# Patient Record
Sex: FEMALE | Employment: OTHER | URBAN - METROPOLITAN AREA
[De-identification: names, ages, dates, MRNs, and addresses within clinical notes are randomized per-mention and may not be internally consistent; named-entity substitution may affect disease eponyms.]

---

## 2023-04-19 ENCOUNTER — OFFICE VISIT (OUTPATIENT)
Dept: PRIMARY CARE | Facility: CLINIC | Age: 43
End: 2023-04-19
Payer: COMMERCIAL

## 2023-04-19 VITALS
TEMPERATURE: 97.8 F | DIASTOLIC BLOOD PRESSURE: 87 MMHG | OXYGEN SATURATION: 94 % | WEIGHT: 158.4 LBS | SYSTOLIC BLOOD PRESSURE: 125 MMHG | HEART RATE: 112 BPM

## 2023-04-19 DIAGNOSIS — M79.7 FIBROMYALGIA: ICD-10-CM

## 2023-04-19 DIAGNOSIS — G43.809 OTHER MIGRAINE WITHOUT STATUS MIGRAINOSUS, NOT INTRACTABLE: ICD-10-CM

## 2023-04-19 DIAGNOSIS — J30.9 ALLERGIC RHINITIS, UNSPECIFIED SEASONALITY, UNSPECIFIED TRIGGER: Primary | ICD-10-CM

## 2023-04-19 DIAGNOSIS — Z72.0 TOBACCO USE: ICD-10-CM

## 2023-04-19 PROCEDURE — 99214 OFFICE O/P EST MOD 30 MIN: CPT | Performed by: FAMILY MEDICINE

## 2023-04-19 RX ORDER — LIDOCAINE 50 MG/G
PATCH TOPICAL
COMMUNITY
Start: 2023-02-20 | End: 2024-02-09 | Stop reason: WASHOUT

## 2023-04-19 RX ORDER — HYDROXYZINE HYDROCHLORIDE 10 MG/1
10 TABLET, FILM COATED ORAL DAILY PRN
Qty: 30 TABLET | Refills: 2 | Status: SHIPPED | OUTPATIENT
Start: 2023-04-19 | End: 2024-03-13 | Stop reason: ALTCHOICE

## 2023-04-19 RX ORDER — AMITRIPTYLINE HYDROCHLORIDE 150 MG/1
1 TABLET ORAL NIGHTLY
COMMUNITY
Start: 2019-08-19 | End: 2024-02-15

## 2023-04-19 RX ORDER — PREGABALIN 75 MG/1
CAPSULE ORAL
COMMUNITY
Start: 2019-08-19 | End: 2024-02-09 | Stop reason: WASHOUT

## 2023-04-19 RX ORDER — PRAZOSIN HYDROCHLORIDE 1 MG/1
1 CAPSULE ORAL NIGHTLY
COMMUNITY
Start: 2023-04-12 | End: 2024-03-13 | Stop reason: WASHOUT

## 2023-04-19 RX ORDER — LIDOCAINE 50 MG/G
OINTMENT TOPICAL
COMMUNITY
Start: 2023-04-18 | End: 2024-02-09 | Stop reason: SDUPTHER

## 2023-04-19 RX ORDER — CELECOXIB 200 MG/1
CAPSULE ORAL
COMMUNITY
Start: 2023-04-18

## 2023-04-19 RX ORDER — SUMATRIPTAN 50 MG/1
50 TABLET, FILM COATED ORAL ONCE AS NEEDED
Qty: 10 TABLET | Refills: 3 | Status: SHIPPED | OUTPATIENT
Start: 2023-04-19 | End: 2024-03-13 | Stop reason: SDUPTHER
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
## 2023-12-12 ENCOUNTER — OFFICE VISIT (OUTPATIENT)
Dept: PRIMARY CARE | Facility: CLINIC | Age: 43
End: 2023-12-12
Payer: COMMERCIAL

## 2023-12-12 VITALS
SYSTOLIC BLOOD PRESSURE: 119 MMHG | RESPIRATION RATE: 16 BRPM | TEMPERATURE: 98.2 F | OXYGEN SATURATION: 100 % | WEIGHT: 147 LBS | HEART RATE: 115 BPM | BODY MASS INDEX: 26.05 KG/M2 | DIASTOLIC BLOOD PRESSURE: 83 MMHG | HEIGHT: 63 IN

## 2023-12-12 DIAGNOSIS — M79.7 FIBROMYALGIA: Primary | ICD-10-CM

## 2023-12-12 DIAGNOSIS — Z12.31 ENCOUNTER FOR SCREENING MAMMOGRAM FOR MALIGNANT NEOPLASM OF BREAST: ICD-10-CM

## 2023-12-12 LAB
25(OH)D3 SERPL-MCNC: 8 NG/ML (ref 30–100)
ANION GAP SERPL CALC-SCNC: 14 MMOL/L (ref 10–20)
BUN SERPL-MCNC: 13 MG/DL (ref 6–23)
CALCIUM SERPL-MCNC: 9.6 MG/DL (ref 8.6–10.6)
CHLORIDE SERPL-SCNC: 107 MMOL/L (ref 98–107)
CO2 SERPL-SCNC: 23 MMOL/L (ref 21–32)
CREAT SERPL-MCNC: 0.67 MG/DL (ref 0.5–1.05)
GFR SERPL CREATININE-BSD FRML MDRD: >90 ML/MIN/1.73M*2
GLUCOSE SERPL-MCNC: 82 MG/DL (ref 74–99)
POTASSIUM SERPL-SCNC: 4.4 MMOL/L (ref 3.5–5.3)
SODIUM SERPL-SCNC: 140 MMOL/L (ref 136–145)
TSH SERPL-ACNC: 0.68 MIU/L (ref 0.44–3.98)

## 2023-12-12 PROCEDURE — 82306 VITAMIN D 25 HYDROXY: CPT | Performed by: NURSE PRACTITIONER

## 2023-12-12 PROCEDURE — 99214 OFFICE O/P EST MOD 30 MIN: CPT | Performed by: NURSE PRACTITIONER

## 2023-12-12 PROCEDURE — 84443 ASSAY THYROID STIM HORMONE: CPT | Performed by: NURSE PRACTITIONER

## 2023-12-12 PROCEDURE — 36415 COLL VENOUS BLD VENIPUNCTURE: CPT | Performed by: NURSE PRACTITIONER

## 2023-12-12 PROCEDURE — 80048 BASIC METABOLIC PNL TOTAL CA: CPT | Performed by: NURSE PRACTITIONER

## 2023-12-12 RX ORDER — BACLOFEN 5 MG/1
5 TABLET ORAL DAILY
Qty: 30 TABLET | Refills: 3 | Status: SHIPPED | OUTPATIENT
Start: 2023-12-12 | End: 2024-02-09

## 2023-12-12 ASSESSMENT — PAIN SCALES - GENERAL: PAINLEVEL: 8

## 2023-12-12 NOTE — PROGRESS NOTES
"Subjective   Irina Duran is a 43 y.o. female who presents for Med Refill (Follow up).  Med Refill      Ms. Duran is a 44 yo F, patient of Dr. Aly, here today for follow up    LV with [facility] in August with referrals provided for pain management and rheumatology.   Patient continues to utilize medical marijuana for management of chronic pain. Expresses curiosity about any changes with controlled substance prescribing and recreational marijuana. Patient understands to follow with pain management for further discussion regarding restarting Lyrica.   She is asking for new referral to pain management. Was being seen through a private practice and would like to consider alternative option. Celexa and prazosin given through pain medicine.   Denies medication management with mental health services at this time.   Requesting refill of baclofen which she has been using for muscle spasms.   Heart rate is notably elevated today. Improved value noted upon auscultation (99).     Patient notes it has been at least 1.5 years since last mammogram screening. Order placed today.     Last blood work available in our system was from 04/22      All systems reviewed. Review of systems negative except for noted positives in HPI    Objective     /83   Pulse (!) 115   Temp 36.8 °C (98.2 °F)   Resp 16   Ht 1.6 m (5' 3\")   Wt 66.7 kg (147 lb)   LMP 11/15/2023   SpO2 100%   BMI 26.04 kg/m²    Vital signs noted and reviewed.       Physical Exam  Constitutional:       Appearance: Normal appearance.   Cardiovascular:      Rate and Rhythm: Regular rhythm. Tachycardia present.   Pulmonary:      Effort: Pulmonary effort is normal. No respiratory distress.      Breath sounds: Normal breath sounds.   Skin:     General: Skin is warm and dry.   Neurological:      Mental Status: She is oriented to person, place, and time.   Psychiatric:         Mood and Affect: Mood normal.             Assessment/Plan   Problem List Items Addressed This " Visit    None  Visit Diagnoses       Fibromyalgia    -  Primary    Relevant Medications    baclofen (Lioresal) 5 mg tablet    Other Relevant Orders    Referral to Rheumatology    Referral to Pain Medicine    Basic Metabolic Panel    TSH with reflex to Free T4 if abnormal    Vitamin D 25-Hydroxy,Total (for eval of Vitamin D levels)    Encounter for screening mammogram for malignant neoplasm of breast        Relevant Orders    Mammogram - OnBase Scan

## 2023-12-12 NOTE — PATIENT INSTRUCTIONS
Thank you for coming in for your visit today!    Please follow up in 6 months with Dr. Aly or sooner if needed.     Referrals for pain medicine and rheumatology provided.     Today we completed blood work. We will contact you with any abnormalities from this testing.      Call 911 or go to the emergency room if you have pain in your chest, difficulty breathing, or other life threatening symptoms.

## 2023-12-13 DIAGNOSIS — E55.9 VITAMIN D DEFICIENCY: Primary | ICD-10-CM

## 2023-12-13 RX ORDER — ERGOCALCIFEROL 1.25 MG/1
50000 CAPSULE ORAL
Qty: 12 CAPSULE | Refills: 0 | Status: SHIPPED | OUTPATIENT
Start: 2023-12-13 | End: 2024-03-06

## 2024-02-09 ENCOUNTER — OFFICE VISIT (OUTPATIENT)
Dept: PAIN MEDICINE | Facility: CLINIC | Age: 44
End: 2024-02-09
Payer: COMMERCIAL

## 2024-02-09 VITALS
SYSTOLIC BLOOD PRESSURE: 130 MMHG | RESPIRATION RATE: 18 BRPM | DIASTOLIC BLOOD PRESSURE: 67 MMHG | TEMPERATURE: 98.2 F | HEART RATE: 106 BPM

## 2024-02-09 DIAGNOSIS — M43.06 LUMBAR SPONDYLOLYSIS: Primary | ICD-10-CM

## 2024-02-09 PROCEDURE — 99213 OFFICE O/P EST LOW 20 MIN: CPT | Mod: ZK

## 2024-02-09 PROCEDURE — 99213 OFFICE O/P EST LOW 20 MIN: CPT

## 2024-02-09 RX ORDER — TIZANIDINE 4 MG/1
4 TABLET ORAL 2 TIMES DAILY PRN
Qty: 60 TABLET | Refills: 2 | Status: SHIPPED | OUTPATIENT
Start: 2024-02-09 | End: 2024-05-15 | Stop reason: SDUPTHER

## 2024-02-09 RX ORDER — DULOXETIN HYDROCHLORIDE 60 MG/1
60 CAPSULE, DELAYED RELEASE ORAL DAILY
Qty: 30 CAPSULE | Refills: 2 | Status: SHIPPED | OUTPATIENT
Start: 2024-02-09 | End: 2024-05-15 | Stop reason: SDUPTHER

## 2024-02-09 RX ORDER — LIDOCAINE 50 MG/G
OINTMENT TOPICAL
Qty: 30 G | Refills: 2 | Status: SHIPPED | OUTPATIENT
Start: 2024-02-09

## 2024-02-09 ASSESSMENT — COLUMBIA-SUICIDE SEVERITY RATING SCALE - C-SSRS
6. HAVE YOU EVER DONE ANYTHING, STARTED TO DO ANYTHING, OR PREPARED TO DO ANYTHING TO END YOUR LIFE?: NO
1. IN THE PAST MONTH, HAVE YOU WISHED YOU WERE DEAD OR WISHED YOU COULD GO TO SLEEP AND NOT WAKE UP?: NO
2. HAVE YOU ACTUALLY HAD ANY THOUGHTS OF KILLING YOURSELF?: NO

## 2024-02-09 ASSESSMENT — PAIN SCALES - GENERAL
PAINLEVEL: 5
PAINLEVEL_OUTOF10: 5 - MODERATE PAIN

## 2024-02-09 ASSESSMENT — PAIN DESCRIPTION - DESCRIPTORS: DESCRIPTORS: SHARP

## 2024-02-09 ASSESSMENT — PAIN - FUNCTIONAL ASSESSMENT: PAIN_FUNCTIONAL_ASSESSMENT: 0-10

## 2024-02-09 NOTE — PATIENT INSTRUCTIONS
Continue to take duloxetine, tizanidine, amitriptyline and celebrex as prescribed.   Use lidocaine ointment as directed.     Follow up in 3 months or as needed.

## 2024-02-09 NOTE — PROGRESS NOTES
Subjective   Patient ID: Irina Duran is a 43 y.o. female who presents for Med Refill.  HPI    44 yo female presents today for medication refills. She is known to this clinic for chronic low back pain and is maintained on amitriptyline, duloxetine, lidocaine 5% ointment, celebrex and tizanidine. She is also using medical marijuana for pain control. She confirms that the medication regimen is helpful in reducing her overall pain level and denies any side effects associated with the medications. She was prescribed baclofen by PCP, this was not effective Pain today is rated 5/10 in the middle of her back and her ankles. Denies any recent falls or injuries. Denies bowel or bladder incontinence.         Review of Systems    All 13 systems were reviewed and are within normal levels except as noted below or per HPI. Positive and pertinent negative responses are noted below or in the HPI   Denied any fever or chills. No weight loss and no night sweats. No cough or sputum production. No diarrhea   No bladder and bowel incontinence and no other changes in bladder and bowel. No skin changes. Reports tiredness and fatigability only if the pain is not controlled.   Denied opioids diversion and abuse and denies alcoholism. Denies overuse of the pain medications.      Objective   Physical Exam    General   Alert and oriented x4, pleasant and cooperative.      HEENT  Pupils are equal and normal in size. Ears, nose, mouth, and throat appear to be WNL.  Head atraumatic, symmetric.      No signs of sedation or signs of withdrawal apparent.     Psychiatric   No signs of depression apparent. Appropriate mood and affect.     Neuro   No focal neurological deficit apparent. Ambulation at baseline.      Respiratory  No respiratory distress, respirations equal and unlabored.     Abdomen  Soft and nontender, no distention noted.     Skin  Warm, dry and intact. No skin markings supportive of recent IV drug usage .            Assessment/Plan      44 yo female with history and physical exam supportive of chronic low back pain associated with lumbago and lumbar spondylosis.     Continue to take duloxetine, tizanidine, amitriptyline and celebrex as prescribed.   Use lidocaine ointment as directed.     Follow up in 3 months or as needed.     Explained plan to this patient, and patient verbalized understanding and agreement with the plan.     Please do not hesitate to contact the pain clinic after your visit with any questions or concerns at  M-F 8-4 pm     Patient was reminded not to share medications, not to take prescription medications that were not prescribed to the patient, and not to increase or change dose without consulting the pain clinic. I advised the patient to always take the least amount of medication needed to keep symptoms under control.          Shayy Mcqueen, IDRIS-CNP 02/09/24 9:09 AM

## 2024-03-13 ENCOUNTER — OFFICE VISIT (OUTPATIENT)
Dept: PRIMARY CARE | Facility: CLINIC | Age: 44
End: 2024-03-13
Payer: COMMERCIAL

## 2024-03-13 VITALS
RESPIRATION RATE: 14 BRPM | BODY MASS INDEX: 25.87 KG/M2 | HEIGHT: 63 IN | HEART RATE: 90 BPM | WEIGHT: 146 LBS | TEMPERATURE: 98.3 F | OXYGEN SATURATION: 96 % | DIASTOLIC BLOOD PRESSURE: 72 MMHG | SYSTOLIC BLOOD PRESSURE: 100 MMHG

## 2024-03-13 DIAGNOSIS — Z72.0 TOBACCO USE: ICD-10-CM

## 2024-03-13 DIAGNOSIS — Z91.018 FOOD ALLERGY: ICD-10-CM

## 2024-03-13 DIAGNOSIS — J30.9 ALLERGIC RHINITIS, UNSPECIFIED SEASONALITY, UNSPECIFIED TRIGGER: ICD-10-CM

## 2024-03-13 DIAGNOSIS — G43.809 OTHER MIGRAINE WITHOUT STATUS MIGRAINOSUS, NOT INTRACTABLE: Primary | ICD-10-CM

## 2024-03-13 DIAGNOSIS — L30.9 ECZEMA, UNSPECIFIED TYPE: ICD-10-CM

## 2024-03-13 PROBLEM — M06.9 RHEUMATOID ARTHRITIS (MULTI): Chronic | Status: ACTIVE | Noted: 2020-06-08

## 2024-03-13 PROBLEM — T30.0 BURN: Status: ACTIVE | Noted: 2022-04-06

## 2024-03-13 PROBLEM — F12.10 CANNABIS USE DISORDER, MILD, ABUSE: Chronic | Status: ACTIVE | Noted: 2022-06-05

## 2024-03-13 PROBLEM — M32.9 SYSTEMIC LUPUS ERYTHEMATOSUS (MULTI): Chronic | Status: ACTIVE | Noted: 2020-06-08

## 2024-03-13 PROBLEM — M43.00 SPONDYLOLYSIS: Status: ACTIVE | Noted: 2024-03-13

## 2024-03-13 PROBLEM — M41.9 SCOLIOSIS: Status: ACTIVE | Noted: 2024-03-13

## 2024-03-13 PROCEDURE — 99214 OFFICE O/P EST MOD 30 MIN: CPT | Performed by: FAMILY MEDICINE

## 2024-03-13 PROCEDURE — 90471 IMMUNIZATION ADMIN: CPT | Performed by: FAMILY MEDICINE

## 2024-03-13 RX ORDER — VARENICLINE TARTRATE 0.5 MG/1
TABLET, FILM COATED ORAL
Qty: 120 TABLET | Refills: 2 | Status: SHIPPED | OUTPATIENT
Start: 2024-03-13

## 2024-03-13 RX ORDER — CETIRIZINE HYDROCHLORIDE 10 MG/1
10 TABLET ORAL DAILY
Qty: 30 TABLET | Refills: 11 | Status: SHIPPED | OUTPATIENT
Start: 2024-03-13 | End: 2025-03-08

## 2024-03-13 RX ORDER — TRIAMCINOLONE ACETONIDE 1 MG/G
OINTMENT TOPICAL 2 TIMES DAILY PRN
Qty: 60 G | Refills: 2 | Status: SHIPPED | OUTPATIENT
Start: 2024-03-13 | End: 2024-07-11

## 2024-03-13 RX ORDER — VARENICLINE TARTRATE 0.5 MG/1
TABLET, FILM COATED ORAL
Qty: 120 TABLET | Refills: 2 | Status: SHIPPED | OUTPATIENT
Start: 2024-03-13 | End: 2024-03-13 | Stop reason: SDUPTHER

## 2024-03-13 RX ORDER — EPINEPHRINE 0.3 MG/.3ML
1 INJECTION SUBCUTANEOUS AS NEEDED
Qty: 2 EACH | Refills: 0 | Status: SHIPPED | OUTPATIENT
Start: 2024-03-13 | End: 2025-03-13

## 2024-03-13 RX ORDER — SUMATRIPTAN 50 MG/1
50 TABLET, FILM COATED ORAL ONCE AS NEEDED
Qty: 10 TABLET | Refills: 3 | Status: SHIPPED | OUTPATIENT
Start: 2024-03-13 | End: 2025-03-13

## 2024-03-13 ASSESSMENT — PAIN SCALES - GENERAL: PAINLEVEL: 5

## 2024-03-13 NOTE — PROGRESS NOTES
"Subjective   Patient ID: Irina Duran is a 43 y.o. who presents for Follow-up    42 yo here for follow-up    Fibromyalgia- Follows with pain management. Taking amitriptyline, celebrex, tizanidine    Allergies  - Can't have sudafed  - Loratadine didn't help  - Hydroxyzine no longer helps  - Not open to trying nasal sprays    Eczema  - Chronic, not currently using anything. Would like cream. Has tried OTC hydrocortisone without relief      Migraine- Longstanding issue, unchanged. No change in quantity, severity or frequency of migraines. Sumatriptan for abortive management.    - Goes through 9 sumatriptan in a month  - Would like to discuss daily medication, however already on maximum dose of amitriptyline. BP low so would like to avoid any medication with potential BP lowering effect    Cigarette smoking  - Has cut back to 1/2 ppd  - Would like to resume Chantix. Tolerated this in the past  - No prior issues with side effects     Asthma  - Declines controller medication. Wants to try quitting smoking first      Review of Systems  10 point review of systems negative except as noted in HPI.    Objective     /72   Pulse 108   Temp 36.8 °C (98.3 °F)   Resp 14   Ht 1.6 m (5' 3\")   Wt 66.2 kg (146 lb)   LMP 02/15/2024   SpO2 96%   BMI 25.86 kg/m²   General: well appearing, no distress  CV: Regular rate and rhythm, no murmur  Lungs: Clear to auscultation bilaterally  Abdomen: Soft, nontender, nondistended  Extremities: No edema noted  Skin: Upper extremities with patches of dry skin, excoriations   Psych: Appropriate mood and affect     Assessment/Plan   42 yo here for follow-up    # Migraine  - Refer to neurology    # Seasonal allergies  - Trial zyrtec    #Eczema  - Recommended daily moisturizer plus triamcinolone for breakthrough     #Tobacco use  - Counseled on cessation. Chantix prescribed. Discussed use and possible side effects    # HM  - Schedule mammogram    RTC 3 months or sooner as needed  "

## 2024-06-14 PROBLEM — T25.221A SECOND DEGREE BURN OF RIGHT FOOT: Status: ACTIVE | Noted: 2022-04-20

## 2024-06-14 PROBLEM — M54.50 LUMBAGO: Status: ACTIVE | Noted: 2023-03-24

## 2024-06-14 PROBLEM — T22.299A: Status: ACTIVE | Noted: 2022-04-20

## 2024-06-14 PROBLEM — F39 UNSPECIFIED MOOD (AFFECTIVE) DISORDER (CMS-HCC): Chronic | Status: ACTIVE | Noted: 2022-05-20

## 2024-06-14 PROBLEM — M79.2 PERIPHERAL NEUROPATHIC PAIN: Status: ACTIVE | Noted: 2024-06-14

## 2024-06-14 PROBLEM — G43.909 MIGRAINE: Status: ACTIVE | Noted: 2020-06-08

## 2024-06-14 PROBLEM — M32.9 SYSTEMIC LUPUS ERYTHEMATOSUS (MULTI): Status: ACTIVE | Noted: 2020-06-08

## 2024-06-14 PROBLEM — M79.7 FIBROMYALGIA: Status: ACTIVE | Noted: 2023-12-12

## 2024-06-14 PROBLEM — F19.21: Status: ACTIVE | Noted: 2024-06-14

## 2024-06-14 PROBLEM — F10.21: Status: ACTIVE | Noted: 2021-06-07

## 2024-06-14 PROBLEM — T20.20XA SECOND DEGREE BURN OF FACE: Status: ACTIVE | Noted: 2022-04-20

## 2024-06-14 PROBLEM — G47.00 INSOMNIA: Status: ACTIVE | Noted: 2024-06-14

## 2024-06-14 PROBLEM — F17.200 SEVERE TOBACCO USE DISORDER: Status: ACTIVE | Noted: 2021-06-07

## 2024-06-14 PROBLEM — M47.816 LUMBAR SPONDYLOSIS: Status: ACTIVE | Noted: 2023-03-24

## 2024-06-14 PROBLEM — M25.50 JOINT PAIN: Status: ACTIVE | Noted: 2022-09-19

## 2024-06-14 PROBLEM — F43.10 POST TRAUMATIC STRESS DISORDER: Chronic | Status: ACTIVE | Noted: 2022-11-23

## 2024-06-14 RX ORDER — ZOLPIDEM TARTRATE 10 MG/1
TABLET ORAL
COMMUNITY
Start: 2019-08-19

## 2024-06-14 RX ORDER — ERGOCALCIFEROL 1.25 MG/1
1 CAPSULE ORAL
COMMUNITY
Start: 2022-10-26 | End: 2024-06-19 | Stop reason: WASHOUT

## 2024-06-14 RX ORDER — DICLOFENAC SODIUM 75 MG/1
1 TABLET, DELAYED RELEASE ORAL 2 TIMES DAILY
COMMUNITY
Start: 2022-06-24

## 2024-06-14 RX ORDER — BACLOFEN 5 MG/1
5 TABLET ORAL DAILY
COMMUNITY
Start: 2024-03-25

## 2024-06-14 RX ORDER — METHYLPREDNISOLONE 4 MG/1
TABLET ORAL
COMMUNITY
Start: 2024-04-02

## 2024-06-14 RX ORDER — DULOXETIN HYDROCHLORIDE 60 MG/1
1 CAPSULE, DELAYED RELEASE ORAL 2 TIMES DAILY
COMMUNITY
Start: 2021-09-16

## 2024-06-14 RX ORDER — LEFLUNOMIDE 10 MG/1
10 TABLET ORAL DAILY
COMMUNITY
Start: 2024-04-24

## 2024-06-14 RX ORDER — CARISOPRODOL 250 MG/1
TABLET ORAL
COMMUNITY
Start: 2019-08-19

## 2024-06-14 RX ORDER — MELOXICAM 15 MG/1
TABLET ORAL
COMMUNITY
Start: 2021-09-16

## 2024-06-14 RX ORDER — ETODOLAC 400 MG/1
TABLET, FILM COATED ORAL
COMMUNITY
Start: 2020-05-08

## 2024-06-14 RX ORDER — BUTALBITAL, ACETAMINOPHEN AND CAFFEINE 50; 325; 40 MG/1; MG/1; MG/1
TABLET ORAL
COMMUNITY
Start: 2019-08-19

## 2024-06-19 ENCOUNTER — OFFICE VISIT (OUTPATIENT)
Dept: PRIMARY CARE | Facility: CLINIC | Age: 44
End: 2024-06-19
Payer: COMMERCIAL

## 2024-06-19 VITALS
SYSTOLIC BLOOD PRESSURE: 99 MMHG | BODY MASS INDEX: 25.16 KG/M2 | TEMPERATURE: 97.3 F | RESPIRATION RATE: 16 BRPM | DIASTOLIC BLOOD PRESSURE: 76 MMHG | OXYGEN SATURATION: 98 % | HEART RATE: 107 BPM | HEIGHT: 63 IN | WEIGHT: 142 LBS

## 2024-06-19 DIAGNOSIS — Z12.31 ENCOUNTER FOR SCREENING MAMMOGRAM FOR MALIGNANT NEOPLASM OF BREAST: ICD-10-CM

## 2024-06-19 DIAGNOSIS — M43.06 LUMBAR SPONDYLOLYSIS: ICD-10-CM

## 2024-06-19 DIAGNOSIS — M54.50 CHRONIC BILATERAL LOW BACK PAIN WITHOUT SCIATICA: ICD-10-CM

## 2024-06-19 DIAGNOSIS — G89.29 CHRONIC BILATERAL LOW BACK PAIN WITHOUT SCIATICA: ICD-10-CM

## 2024-06-19 DIAGNOSIS — J30.9 ALLERGIC RHINITIS, UNSPECIFIED SEASONALITY, UNSPECIFIED TRIGGER: ICD-10-CM

## 2024-06-19 DIAGNOSIS — Z00.00 ROUTINE ADULT HEALTH MAINTENANCE: Primary | ICD-10-CM

## 2024-06-19 DIAGNOSIS — E55.9 VITAMIN D DEFICIENCY: ICD-10-CM

## 2024-06-19 DIAGNOSIS — G43.809 OTHER MIGRAINE WITHOUT STATUS MIGRAINOSUS, NOT INTRACTABLE: ICD-10-CM

## 2024-06-19 DIAGNOSIS — Z72.0 TOBACCO USE: ICD-10-CM

## 2024-06-19 LAB
25(OH)D3 SERPL-MCNC: 51 NG/ML (ref 30–100)
CHOLEST SERPL-MCNC: 188 MG/DL (ref 0–199)
CHOLESTEROL/HDL RATIO: 3.6
EST. AVERAGE GLUCOSE BLD GHB EST-MCNC: 105 MG/DL
HBA1C MFR BLD: 5.3 %
HDLC SERPL-MCNC: 52 MG/DL
LDLC SERPL CALC-MCNC: 96 MG/DL
NON HDL CHOLESTEROL: 136 MG/DL (ref 0–149)
TRIGL SERPL-MCNC: 201 MG/DL (ref 0–149)
VLDL: 40 MG/DL (ref 0–40)

## 2024-06-19 PROCEDURE — 36415 COLL VENOUS BLD VENIPUNCTURE: CPT | Performed by: FAMILY MEDICINE

## 2024-06-19 PROCEDURE — 83036 HEMOGLOBIN GLYCOSYLATED A1C: CPT | Performed by: FAMILY MEDICINE

## 2024-06-19 PROCEDURE — 99396 PREV VISIT EST AGE 40-64: CPT | Performed by: FAMILY MEDICINE

## 2024-06-19 PROCEDURE — 82306 VITAMIN D 25 HYDROXY: CPT | Performed by: FAMILY MEDICINE

## 2024-06-19 PROCEDURE — 80061 LIPID PANEL: CPT | Performed by: FAMILY MEDICINE

## 2024-06-19 RX ORDER — ACETAMINOPHEN 500 MG
1000 TABLET ORAL EVERY 6 HOURS PRN
Qty: 30 TABLET | Refills: 0 | Status: SHIPPED | OUTPATIENT
Start: 2024-06-19 | End: 2024-06-29

## 2024-06-19 RX ORDER — AMITRIPTYLINE HYDROCHLORIDE 150 MG/1
150 TABLET ORAL NIGHTLY
Qty: 30 TABLET | Refills: 5 | Status: SHIPPED | OUTPATIENT
Start: 2024-06-19

## 2024-06-19 RX ORDER — SUMATRIPTAN 50 MG/1
50 TABLET, FILM COATED ORAL ONCE AS NEEDED
Qty: 10 TABLET | Refills: 3 | Status: SHIPPED | OUTPATIENT
Start: 2024-06-19 | End: 2025-06-19

## 2024-06-19 RX ORDER — VARENICLINE TARTRATE 0.5 MG/1
TABLET, FILM COATED ORAL
Qty: 120 TABLET | Refills: 2 | Status: SHIPPED | OUTPATIENT
Start: 2024-06-19

## 2024-06-19 RX ORDER — ACETAMINOPHEN 500 MG
5000 TABLET ORAL DAILY
Qty: 30 TABLET | Refills: 5 | Status: SHIPPED | OUTPATIENT
Start: 2024-06-19

## 2024-06-19 RX ORDER — HYDROXYZINE HYDROCHLORIDE 10 MG/1
10 TABLET, FILM COATED ORAL DAILY PRN
Qty: 30 TABLET | Refills: 5 | Status: SHIPPED | OUTPATIENT
Start: 2024-06-19 | End: 2024-12-16

## 2024-06-19 ASSESSMENT — PAIN SCALES - GENERAL: PAINLEVEL: 8

## 2024-06-19 ASSESSMENT — PATIENT HEALTH QUESTIONNAIRE - PHQ9
SUM OF ALL RESPONSES TO PHQ9 QUESTIONS 1 AND 2: 0
2. FEELING DOWN, DEPRESSED OR HOPELESS: NOT AT ALL
1. LITTLE INTEREST OR PLEASURE IN DOING THINGS: NOT AT ALL

## 2024-06-19 NOTE — PROGRESS NOTES
"Subjective   Patient ID: Irina Duran is a 43 y.o. female who presents for Follow-up and health maintenance visit.     HPI     Irina Duran is a 43 y.o. year old here for an annual physical.    Health maintenance  Diet: Working on dietary modifications. Close to goal weight   Exercise: Limited due to pain  Tobacco: Has decreased use to 1/3PPD  Alcohol: No use  Drugs: Marijuana  Sex: with , no concerns about STDs  Depression: PHQ2=0  IPV: Denies  Breast Cancer- Due for mammogram   Cervical Cancer- Reports done in 2020, due next year  Colon Cancer- Reports done in 2020, due in 2030  Lung Cancer: Does not qualify for screening at this point  Lipid: Due for lipids  A1c: Due  HIV (15-65): Declines  HCV (18-79): Declines     #Wrist pain   - years of pain, gotten worse lately (last since last visit)  - got a new caravan, and the driving makes it feel better than worse. The vibrations help and then it gets worse after   - takes some of her medications   - needs meds refilled  - needs lidocaine    #Asthma   - been better lately   - has not had to use an inhaler since she moved home (2019)  - worse in hot weather     #Eczema   - still itches a lot, been using the creams     #migraines   - pretty bad, worse than normal   - has been out of her meds     #allergies   - worse with the weather   - sneezing, runny nose   - the medications haven't been working, has been taking them   - 8 cats, 1 dog in the house  - Has tried cetirizine, loratadine. Declines nasal sprays. Previously took hydroxyzine with improvement     #Smoking   - three days per pack, taking chantix. Has just reached goal dose       Review of Systems  Negative besides for those mentioned above.     Objective   BP 99/76   Pulse 107   Temp 36.3 °C (97.3 °F)   Resp 16   Ht 1.6 m (5' 3\")   Wt 64.4 kg (142 lb)   LMP 05/15/2024   SpO2 98%   BMI 25.15 kg/m²     Physical Exam  Constitutional:       Appearance: Normal appearance.   HENT:      Head: " Normocephalic.   Cardiovascular:      Rate and Rhythm: Normal rate and regular rhythm.   Pulmonary:      Effort: Pulmonary effort is normal.      Breath sounds: Rhonchi present.      Comments: Rhonchi in lower right lobe   Skin:     General: Skin is warm and dry.      Capillary Refill: Capillary refill takes less than 2 seconds.   Neurological:      General: No focal deficit present.      Mental Status: She is alert and oriented to person, place, and time.   Psychiatric:         Mood and Affect: Mood normal.         Behavior: Behavior normal.       Assessment/Plan   Irina Duran is a 43 y.o. female presenting for a follow-up. She has been out of a lot of her medications, which has led to some of her chronic conditions to worsen (migraine, fibromyalgia, allergies). Her asthma has improved with her cutting back on smoking, so will continue to  on cutting back.     Pain management   - Refilled tylenol, lidocaine patches   - Ordered vitamin D, checking levels today     Migraines  - Refilled sumatriptan, amitriptyline   - neurology referral     Allergies   -  No improvement with cetirizine or loratadine. Can trial atarax, discussed possible side effects.   - Recommended nasal steroids, patient declined    Smoking   - continue with Chantix (refilled today)     Preventative screenings  - Lipid panel   - HbA1c   - Mammogram   - Last pap smear and colonoscopy were four and a half years ago     RTC in 3 months      Samara Becker, MS3     Patient was seen and examined by myself in conjunction with medical student. I have edited note above. Cammie Aly

## 2024-06-19 NOTE — PATIENT INSTRUCTIONS
Thank you for coming in today!    Please schedule a mammogram  I will contact you with your lab results  We will restart the hydroxyzine   Please schedule an appointment with neurology for your migraines    I will see you back in about 3 months

## 2024-06-19 NOTE — PROGRESS NOTES
Subjective   Patient ID: Irina Duran is a 43 y.o. who presents for Follow-up    42 yo here for follow-up    Health maintenance:  Diet: ***  Exercise: ***  Tobacco: ***  Alcohol: ***  Drugs: ***  Sex: ***  LMP: ***  BIrth Control: ***  Depression: ***  IPV: ***    The ASCVD Risk score (Cheryl HUFFMAN, et al., 2019) failed to calculate for the following reasons:    Cannot find a previous HDL lab    Cannot find a previous total cholesterol lab    Unable to determine if patient is Non-   Risk factors: male, AA, older age, HTN, HLD, smoking, DM, FHx  Statin?: ***  ASA? (ASCVD >10%, age 40-59, low risk of bleeding): ***    Immunizations due: ***    Breast Cancer  - FHx?: ***  - last mammogram: ***    Cervical Cancer  - last pap: ***    Colon Cancer  - FHx?: ***  - last colonoscopy: ***    Lung Cancer: ***  Lipid: ***  A1c: ***  HIV (15-65): ***  HCV (18-79): ***     Fibromyalgia- Follows with pain management. Taking amitriptyline, celebrex, tizanidine    Allergies  - Can't have sudafed  - Loratadine didn't help  - Hydroxyzine no longer helps  - Not open to trying nasal sprays    Eczema  - Chronic, not currently using anything. Would like cream. Has tried OTC hydrocortisone without relief      Migraine- Longstanding issue, unchanged. No change in quantity, severity or frequency of migraines. Sumatriptan for abortive management.    - Goes through 9 sumatriptan in a month  - Would like to discuss daily medication, however already on maximum dose of amitriptyline. BP low so would like to avoid any medication with potential BP lowering effect    Cigarette smoking  - Has cut back to 1/2 ppd  - Would like to resume Chantix. Tolerated this in the past  - No prior issues with side effects     Asthma  - Declines controller medication. Wants to try quitting smoking first      Review of Systems  10 point review of systems negative except as noted in HPI.    Objective     BP 99/76   Pulse 107   Temp 36.3 °C  "(97.3 °F)   Resp 16   Ht 1.6 m (5' 3\")   Wt 64.4 kg (142 lb)   LMP 05/15/2024   SpO2 98%   BMI 25.15 kg/m²   General: well appearing, no distress  CV: Regular rate and rhythm, no murmur  Lungs: Clear to auscultation bilaterally  Abdomen: Soft, nontender, nondistended  Extremities: No edema noted  Skin: Upper extremities with patches of dry skin, excoriations   Psych: Appropriate mood and affect     Assessment/Plan   42 yo here for follow-up    # Migraine  - Refer to neurology    # Seasonal allergies  - Trial zyrtec    #Eczema  - Recommended daily moisturizer plus triamcinolone for breakthrough     #Tobacco use  - Counseled on cessation. Chantix prescribed. Discussed use and possible side effects    # HM  - Schedule mammogram    RTC 3 months or sooner as needed  "

## 2024-08-15 DIAGNOSIS — Z72.0 TOBACCO USE: ICD-10-CM

## 2024-08-16 RX ORDER — VARENICLINE TARTRATE 0.5 MG/1
TABLET, FILM COATED ORAL
Qty: 120 TABLET | Refills: 2 | Status: SHIPPED | OUTPATIENT
Start: 2024-08-16

## 2024-09-30 ENCOUNTER — APPOINTMENT (OUTPATIENT)
Dept: PRIMARY CARE | Facility: CLINIC | Age: 44
End: 2024-09-30
Payer: COMMERCIAL

## 2024-10-25 ENCOUNTER — OFFICE VISIT (OUTPATIENT)
Dept: PAIN MEDICINE | Facility: CLINIC | Age: 44
End: 2024-10-25
Payer: COMMERCIAL

## 2024-10-25 VITALS
SYSTOLIC BLOOD PRESSURE: 125 MMHG | DIASTOLIC BLOOD PRESSURE: 74 MMHG | RESPIRATION RATE: 18 BRPM | HEART RATE: 109 BPM | TEMPERATURE: 97.2 F | OXYGEN SATURATION: 96 %

## 2024-10-25 DIAGNOSIS — M43.06 LUMBAR SPONDYLOLYSIS: ICD-10-CM

## 2024-10-25 DIAGNOSIS — M54.50 CHRONIC BILATERAL LOW BACK PAIN WITHOUT SCIATICA: ICD-10-CM

## 2024-10-25 DIAGNOSIS — G89.29 CHRONIC BILATERAL LOW BACK PAIN WITHOUT SCIATICA: ICD-10-CM

## 2024-10-25 PROCEDURE — 99213 OFFICE O/P EST LOW 20 MIN: CPT

## 2024-10-25 RX ORDER — CELECOXIB 200 MG/1
200 CAPSULE ORAL DAILY
Qty: 30 CAPSULE | Refills: 2 | Status: SHIPPED | OUTPATIENT
Start: 2024-10-25 | End: 2025-01-23

## 2024-10-25 RX ORDER — TIZANIDINE 4 MG/1
4 TABLET ORAL EVERY 8 HOURS PRN
Qty: 60 TABLET | Refills: 2 | Status: SHIPPED | OUTPATIENT
Start: 2024-10-25 | End: 2025-01-23

## 2024-10-25 RX ORDER — DULOXETIN HYDROCHLORIDE 60 MG/1
60 CAPSULE, DELAYED RELEASE ORAL 2 TIMES DAILY
Qty: 60 CAPSULE | Refills: 2 | Status: SHIPPED | OUTPATIENT
Start: 2024-10-25 | End: 2025-01-23

## 2024-10-25 RX ORDER — LIDOCAINE 50 MG/G
OINTMENT TOPICAL
Qty: 50 G | Refills: 0 | Status: SHIPPED | OUTPATIENT
Start: 2024-10-25

## 2024-10-25 RX ORDER — AMITRIPTYLINE HYDROCHLORIDE 150 MG/1
150 TABLET ORAL NIGHTLY
Qty: 30 TABLET | Refills: 2 | Status: SHIPPED | OUTPATIENT
Start: 2024-10-25 | End: 2025-01-23

## 2024-10-25 ASSESSMENT — PAIN DESCRIPTION - DESCRIPTORS: DESCRIPTORS: THROBBING

## 2024-10-25 ASSESSMENT — PAIN - FUNCTIONAL ASSESSMENT: PAIN_FUNCTIONAL_ASSESSMENT: 0-10

## 2024-10-25 ASSESSMENT — PAIN SCALES - GENERAL
PAINLEVEL_OUTOF10: 6
PAINLEVEL_OUTOF10: 6

## 2024-10-25 ASSESSMENT — COLUMBIA-SUICIDE SEVERITY RATING SCALE - C-SSRS
1. IN THE PAST MONTH, HAVE YOU WISHED YOU WERE DEAD OR WISHED YOU COULD GO TO SLEEP AND NOT WAKE UP?: NO
2. HAVE YOU ACTUALLY HAD ANY THOUGHTS OF KILLING YOURSELF?: NO
6. HAVE YOU EVER DONE ANYTHING, STARTED TO DO ANYTHING, OR PREPARED TO DO ANYTHING TO END YOUR LIFE?: NO

## 2024-10-25 NOTE — PROGRESS NOTES
Subjective   Patient ID: Irina Duran is a 43 y.o. female who presents for Med Refill.  HPI    42 yo female presents today for medication refills. She is known to this clinic for chronic low back pain. She is maintained on tizanidine 4mg twice daily, duloxetine, amitriptyline, celebrex, lidocaine ointment. She confirms that the medications are effective at reducing her overall pain level. She is experiencing more muscle spasms, denies any side effects from her medications. She has stopped using medical marijuana 2.5 weeks ago. Would like to switch back to pregabalin 75mg three times daily. Pain today is rated 6/10 in the low back.       Review of Systems  All 13 systems were reviewed and are within normal levels except as noted below or per HPI. Positive and pertinent negative responses are noted below or in the HPI   Denied any fever or chills. No weight loss and no night sweats. No cough or sputum production. No diarrhea   Denies constipation.   No bladder and bowel incontinence and no other changes in bladder and bowel. No skin changes. Reports tiredness and fatigability only if the pain is not controlled.   Denied opioids diversion and abuse and denies alcoholism. Denies overuse of the pain medications.      Objective   Physical Exam  General   Alert and oriented x4, pleasant and cooperative.      HEENT  Pupils are equal and normal in size. Ears, nose, mouth, and throat appear to be WNL.  Head atraumatic, symmetric.      No signs of sedation or signs of withdrawal apparent.     Psychiatric   No signs of depression apparent. Appropriate mood and affect.     Neuro   No focal neurological deficit apparent. Ambulation at baseline.      Respiratory  No respiratory distress, respirations equal and unlabored.     Abdomen  Soft and nontender, no distention noted.     Skin  Warm, dry and intact. No skin markings supportive of recent IV drug usage .            Assessment/Plan     42 yo female with history and physical  exam supportive of chronic low back pain associated with lumbago and lumbar spondylosis.     I have personally reviewed the OARRS report for this patient. I have considered the risks of abuse, dependence, addiction and diversion. I believe that it is clinically appropriate for this patient to be prescribed this medication based on documented diagnosis.  I believe the benefits of the continuation of the opiate outweighs the risk. Patient has described positive response to the present medication therapy. Patient denies any side effects associated with the usage of the medication. Patient did not elicit any signs supportive of misuse or abuse. The review of the [place] Automated Reporting prescription is not suggestive of any worrisome pattern. Patient believes the usage of this medication has improved the quality of life and allow him to participate in day-to-day activity. Therefore  I recommend the continuation of this medication and I will be refilling the medication prescription.    The patient was counseled regarding diagnostic results, instructions for management, risk factor reductions, prognosis, patient and family education, impressions, risks and benefits of treatment options and importance of compliance with treatment.          Follow up in 1-2 months for tox screen, last marijuana use was 2.5 weeks ago. Can discuss restarting pregabalin at that time.    Take tizanidine 4mg up to 3 times as needed for muscle spasms.   Continue to take lidocaine ointment, duloxetine, celebrex and amitriptyline as prescribed.     Explained plan to this patient, and patient verbalized understanding and agreement with the plan.     Please do not hesitate to contact the pain clinic after your visit with any questions or concerns at  M-F 8-4 pm     Patient was reminded not to share medications, not to take prescription medications that were not prescribed to the patient, and not to increase or change dose without  consulting the pain clinic. I advised the patient to always take the least amount of medication needed to keep symptoms under control.       IDRIS Moore-CNP 10/25/24 10:38 AM

## 2024-11-11 DIAGNOSIS — J30.9 ALLERGIC RHINITIS, UNSPECIFIED SEASONALITY, UNSPECIFIED TRIGGER: ICD-10-CM

## 2024-11-11 DIAGNOSIS — E55.9 VITAMIN D DEFICIENCY: ICD-10-CM

## 2024-11-11 DIAGNOSIS — M43.06 LUMBAR SPONDYLOLYSIS: ICD-10-CM

## 2024-11-11 RX ORDER — HYDROXYZINE HYDROCHLORIDE 10 MG/1
10 TABLET, FILM COATED ORAL DAILY PRN
Qty: 30 TABLET | Refills: 5 | Status: SHIPPED | OUTPATIENT
Start: 2024-11-11 | End: 2025-05-10

## 2024-11-11 RX ORDER — ACETAMINOPHEN 500 MG
5000 TABLET ORAL DAILY
Qty: 30 TABLET | Refills: 5 | Status: SHIPPED | OUTPATIENT
Start: 2024-11-11